# Patient Record
Sex: FEMALE | URBAN - METROPOLITAN AREA
[De-identification: names, ages, dates, MRNs, and addresses within clinical notes are randomized per-mention and may not be internally consistent; named-entity substitution may affect disease eponyms.]

---

## 2022-02-21 ENCOUNTER — APPOINTMENT (RX ONLY)
Dept: URBAN - METROPOLITAN AREA CLINIC 40 | Facility: CLINIC | Age: 42
Setting detail: DERMATOLOGY
End: 2022-02-21

## 2022-02-21 DIAGNOSIS — L81.1 CHLOASMA: ICD-10-CM | Status: INADEQUATELY CONTROLLED

## 2022-02-21 PROCEDURE — ? SKIN MEDICINALS

## 2022-02-21 PROCEDURE — 99204 OFFICE O/P NEW MOD 45 MIN: CPT

## 2022-02-21 PROCEDURE — ? ADDITIONAL NOTES

## 2022-02-21 PROCEDURE — ? COUNSELING

## 2022-02-21 NOTE — PROCEDURE: [facility] [employer]IC[facility]
Intro Statement: I recommended the following products:
Sig: Apply to affected areas twice daily
Sig: Apply a thin layer to the affected skin twice daily
Product Type (1): Lightening Cream
Sig: Apply twice daily for 5 days
Sig: Apply pea sized amount per area at night
Sig: Apply nightly to warts nightly under occlusion
Sig: Apply a thin layer to the affected areas twice daily
Sig: Wash affected areas daily.
Sig: Apply a thin layer to the affected areas daily
Sig: Apply a thin layer to affected areas twice daily for 6 weeks
Sig: Apply a thin layer to the scar daily
Sig: Apply to affected areas on face twice daily
Lightening Cream: Hydroquinone 8%, Tretinoin 0.025%, Kojic Acid 1%, Niacinamide 4%, Fluocinolone 0.025% Cream
Detail Level: Zone
Sig: Apply nightly to warts nightly x 3 weeks

## 2022-02-21 NOTE — HPI: DISCOLORATION (MELASMA)
How Severe Are They?: mild
Is This A New Presentation, Or A Follow-Up?: Discoloration
Additional History: States she first noticed it in 2009 when she was pregnant \\nStates she has used hydroquinone OTC